# Patient Record
Sex: FEMALE | Race: BLACK OR AFRICAN AMERICAN | Employment: OTHER | URBAN - METROPOLITAN AREA
[De-identification: names, ages, dates, MRNs, and addresses within clinical notes are randomized per-mention and may not be internally consistent; named-entity substitution may affect disease eponyms.]

---

## 2021-12-15 ENCOUNTER — APPOINTMENT (OUTPATIENT)
Dept: CT IMAGING | Age: 76
End: 2021-12-15
Payer: MEDICARE

## 2021-12-15 ENCOUNTER — HOSPITAL ENCOUNTER (EMERGENCY)
Age: 76
Discharge: HOME | End: 2021-12-15
Attending: EMERGENCY MEDICINE
Payer: MEDICARE

## 2021-12-15 VITALS
WEIGHT: 171 LBS | HEIGHT: 64 IN | BODY MASS INDEX: 29.19 KG/M2 | DIASTOLIC BLOOD PRESSURE: 75 MMHG | OXYGEN SATURATION: 97 % | RESPIRATION RATE: 18 BRPM | TEMPERATURE: 97.8 F | HEART RATE: 71 BPM | SYSTOLIC BLOOD PRESSURE: 150 MMHG

## 2021-12-15 DIAGNOSIS — S01.81XA FACIAL LACERATION, INITIAL ENCOUNTER: Primary | ICD-10-CM

## 2021-12-15 DIAGNOSIS — S02.85XA ORBIT FRACTURE, LEFT, CLOSED, INITIAL ENCOUNTER (HCC): ICD-10-CM
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
## 2021-12-15 PROCEDURE — 90471 IMMUNIZATION ADMIN: CPT | Performed by: EMERGENCY MEDICINE

## 2021-12-15 PROCEDURE — 12011 RPR F/E/E/N/L/M 2.5 CM/<: CPT

## 2021-12-15 PROCEDURE — 70450 CT HEAD/BRAIN W/O DYE: CPT

## 2021-12-15 PROCEDURE — 99283 EMERGENCY DEPT VISIT LOW MDM: CPT

## 2021-12-15 PROCEDURE — 90715 TDAP VACCINE 7 YRS/> IM: CPT | Performed by: EMERGENCY MEDICINE

## 2021-12-15 PROCEDURE — 6360000002 HC RX W HCPCS: Performed by: EMERGENCY MEDICINE

## 2021-12-15 PROCEDURE — 70486 CT MAXILLOFACIAL W/O DYE: CPT

## 2021-12-15 RX ORDER — LOSARTAN POTASSIUM 50 MG/1
50 TABLET ORAL DAILY
COMMUNITY

## 2021-12-15 RX ADMIN — TETANUS TOXOID, REDUCED DIPHTHERIA TOXOID AND ACELLULAR PERTUSSIS VACCINE, ADSORBED 0.5 ML: 5; 2.5; 8; 8; 2.5 SUSPENSION INTRAMUSCULAR at 06:54

## 2021-12-15 ASSESSMENT — PAIN SCALES - GENERAL: PAINLEVEL_OUTOF10: 6

## 2021-12-15 NOTE — ED NOTES
Discharge instructions given, patient acknowledged understanding, patient ambulated out of ed upon discharge      Ade Hamlin RN  12/15/21 [id number]

## 2021-12-15 NOTE — ED PROVIDER NOTES
[address] [facility]  eMERGENCY dEPARTMENT eNCOUnter        Pt Name: Claudia Smith  MRN: [id number]  [place] 1945  Date of evaluation: 12/15/2021  Provider: Tyler Kendall MD  PCP: Referring Not In System (Inactive)      CHIEF COMPLAINT       Chief Complaint   Patient presents with    Fall     when asleep she rolled out of bed and hit left side of face on bedside table swelling to left eye and laceration to cheek        HISTORY OFPRESENT ILLNESS   (Location/Symptom, Timing/Onset, Context/Setting, Quality, Duration, Modifying Factors,Severity)  Note limiting factors. Claudia Smith is a 64 y.o. female states she rolled out of bed and struck her face on the floor while she was sleeping complains of pain over the left cheek with a laceration no other complaints of neck or head pain she is not on any blood thinners denies any other injuries to the chest shoulder or wrist or extremities    Nursing Notes were all reviewed and agreed with or any disagreements were addressed  in the HPI. REVIEW OF SYSTEMS    (2-9 systems for level 4, 10 or more for level 5)       REVIEW OF SYSTEMS    Constitutional:  Denies fever, chills, or weakness   Eyes:  Denies vision changes  HENT:  Denies sore throat or neck pain   Respiratory:  Denies cough or shortness of breath   Cardiovascular:  Denies chest pain  GI:  Denies abdominal pain, nausea, vomiting, or diarrhea   Musculoskeletal:  Denies back pain   Skin: no rash or vesicles   Neurologic:  no headache weakness focal    Lymphatic:  no swollen  nodes   Psychiatric: no si or hs thoughts     All systems negative except as marked. Positives and Pertinent negatives as per HPI. Except as noted above in the ROS, all other systems were reviewed andnegative.        PASTMEDICAL HISTORY     Past Medical History:   Diagnosis Date    Diabetes mellitus ([facility] [address]     Hyperlipidemia          SURGICAL HISTORY       Past Surgical History: Procedure Laterality Date    BREAST SURGERY      biposies x5    TOTAL KNEE ARTHROPLASTY      Left         CURRENT MEDICATIONS       Previous Medications    ASPIRIN 81 MG TABLET    Take 81 mg by mouth daily. LOSARTAN (COZAAR) 50 MG TABLET    Take 50 mg by mouth daily    METFORMIN (GLUCOPHAGE) 500 MG TABLET    Take 1,000 mg by mouth 2 times daily (with meals) 500 in am, 1000 at night     METOPROLOL (LOPRESSOR) 50 MG TABLET    Take 0.5 tablets by mouth 2 times daily. SIMVASTATIN (ZOCOR) 80 MG TABLET    Take 40 mg by mouth nightly     SITAGLIPTIN (JANUVIA) 50 MG TABLET    Take 50 mg by mouth daily       ALLERGIES     Patient has no known allergies. FAMILY HISTORY     History reviewed. No pertinent family history. SOCIAL HISTORY       Social History     Socioeconomic History    Marital status:      Spouse name: None    Number of children: None    Years of education: None    Highest education level: None   Occupational History    None   Tobacco Use    Smoking status: Never Smoker    Smokeless tobacco: Never Used    Tobacco comment: second hand smoke -  smokes in the home   Substance and Sexual Activity    Alcohol use: No     Comment: minimal    Drug use: No    Sexual activity: Yes     Partners: Male   Other Topics Concern    None   Social History Narrative    None     Social Determinants of Health     Financial Resource Strain:     Difficulty of Paying Living Expenses: Not on file   Food Insecurity:     Worried About Running Out of Food in the Last Year: Not on file    Joshua of Food in the Last Year: Not on file   Transportation Needs:     Lack of Transportation (Medical): Not on file    Lack of Transportation (Non-Medical):  Not on file   Physical Activity:     Days of Exercise per Week: Not on file    Minutes of Exercise per Session: Not on file   Stress:     Feeling of Stress : Not on file   Social Connections:     Frequency of Communication with Friends and Family: Not on file    Frequency of Social Gatherings with Friends and Family: Not on file    Attends Protestant Services: Not on file    Active Member of Clubs or Organizations: Not on file    Attends Club or Organization Meetings: Not on file    Marital Status: Not on file   Intimate Partner Violence:     Fear of Current or Ex-Partner: Not on file    Emotionally Abused: Not on file    Physically Abused: Not on file    Sexually Abused: Not on file   Housing Stability:     Unable to Pay for Housing in the Last Year: Not on file    Number of [place] in the Last Year: Not on file    Unstable Housing in the Last Year: Not on file       SCREENINGS             PHYSICAL EXAM    (up to 7 for level 4, 8 or more for level 5)     ED Triage Vitals [12/15/21 1306]   BP Temp Temp Source Pulse Resp SpO2 Height Weight   (!) 150/75 97.8 °F (36.6 °C) Oral 71 18 97 % 5' 4\" (1.626 m) 171 lb (77.6 kg)           General Appearance:  Alert, cooperative, no distress, appears stated age. Head:  Normocephalic, without obvious abnormality, atraumatic. Eyes:  conjunctiva/corneas clear, EOM's intact. Sclera anicteric. ENT: Mucous membranes moist. swelling of the left cheek with a 1 cm laceration   Neck: Supple, symmetrical, trachea midline, no adenopathy. No jugular venous distention. Lungs:   No Respiratory Distress. no rales  rhonchi rub   Chest Wall:  Nontender  no deformity   Heart:  Rsr no murmer gallop    Abdomen:   Soft nontender no organomegally    Extremities:  Full range of motion. no deformity   Pulses: Equal  upper and lower    Skin:  No rashes or lesions to exposed skin. Neurologic: Alert and oriented X 3. Motor grossly normal.  Speech clear. Cr n 2-12 intact       DIAGNOSTIC RESULTS   LABS:    Labs Reviewed - No data to display    All other labs were within normal range or not returned as of thisdictation. EKG:  All EKG's are interpreted by the Emergency Department Physician who either signs or follow-up      The patient tolerated their visit well. Thepatient and / or the family were informed of the results of any tests, a time was given to answer questions. FINAL IMPRESSION      1. Facial laceration, initial encounter    2. Fall, initial encounter    3. Orbit fracture, left, closed, initial encounter [facility])        DISPOSITION/PLAN   DISPOSITION Decision To Discharge 12/15/2021 06:23:12 AM      PATIENT REFERRED TO:  Mike Roberts MD  [address] [place] [address]  [phone]            DISCHARGE MEDICATIONS:  New Prescriptions    No medications on file       DISCONTINUED MEDICATIONS:  Discontinued Medications    No medications on file              (Please note that portions of this note were completed with a voice recognition program.  Efforts were made to edit the dictations but occasionally words aremis-transcribed.)    Elana Hurst MD (electronically signed)          Elana Hurst MD  12/15/21 5857

## 2021-12-22 ENCOUNTER — OFFICE VISIT (OUTPATIENT)
Dept: ENT CLINIC | Age: 76
End: 2021-12-22
Payer: MEDICARE

## 2021-12-22 VITALS — HEIGHT: 64 IN | WEIGHT: 177 LBS | BODY MASS INDEX: 30.22 KG/M2
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
## 2021-12-22 DIAGNOSIS — S01.81XA FACIAL LACERATION, INITIAL ENCOUNTER: ICD-10-CM

## 2021-12-22 DIAGNOSIS — S02.32XA CLOSED FRACTURE OF LEFT ORBITAL FLOOR, INITIAL ENCOUNTER (HCC): Primary | ICD-10-CM

## 2021-12-22 PROCEDURE — 99204 OFFICE O/P NEW MOD 45 MIN: CPT | Performed by: STUDENT IN AN ORGANIZED HEALTH CARE EDUCATION/TRAINING PROGRAM

## 2021-12-22 NOTE — PROGRESS NOTES
[address] [facility] (:  1945) is a 64 y.o. female, here for evaluation of the following chief complaint(s):  No chief complaint on file. ASSESSMENT/PLAN:  1. Closed fracture of left orbital floor, initial encounter ([address]  2. Facial laceration, initial encounter  3. Fall, initial encounter      This is a very pleasant 64 y.o. female here today for evaluation of the the above-noted complaints. Independently interpreted the patient's CT maxillofacial scan and it shows evidence of a minimally displaced left orbital floor fracture with associated hemorrhage in the sinus. She has no other apparent facial fractures. The patient has a 1 cm linear laceration of the left cheek area. This is currently covered with Dermabond. I instructed the patient to remove the Dermabond and to begin routine post incision treatment    The patient I had a lengthy discussion regarding management of her facial fracture. At is it is minimally displaced, there is no evidence of entrapment, she has no evidence of enophthalmos, we discussed that observation is appropriate at this time. I have asked the patient to adhere to sinus precautions going forward. She should adhere to these for the next 2 weeks. The risks of nosebleeds in the setting of perifacial trauma were discussed. Medical Decision Making: The following items were considered in medical decision making:  Independent review of images  Review / order clinical lab tests  Review / order radiology tests  Decision to obtain old records  Review and summation of old records as accessed through [facility] if applicable    SUBJECTIVE/OBJECTIVE:  NANDINI Ruiz is here today for evaluation of issues related to a fall. The patient states she was in bed and fell when she was having a dream she was fighting a demon.   She states she fell and struck the left side of her

## 2022-02-15 ENCOUNTER — TELEPHONE (OUTPATIENT)
Dept: ENT CLINIC | Age: 77
End: 2022-02-15

## 2022-02-15 NOTE — TELEPHONE ENCOUNTER
Patient called and to let Dr Alcira Kimble know that the injury to her eye (12/15) is leaking and swollen still. It is still dark in color. It feels different on the inside and is tearing. Patient is concerned that something isn't right.

## 2022-02-16 ENCOUNTER — OFFICE VISIT (OUTPATIENT)
Dept: ENT CLINIC | Age: 77
End: 2022-02-16
Payer: MEDICARE

## 2022-02-16 VITALS
HEART RATE: 83 BPM | SYSTOLIC BLOOD PRESSURE: 145 MMHG | BODY MASS INDEX: 29.88 KG/M2 | DIASTOLIC BLOOD PRESSURE: 74 MMHG | HEIGHT: 63 IN | WEIGHT: 168.6 LBS

## 2022-02-16 DIAGNOSIS — S02.32XD CLOSED FRACTURE OF LEFT ORBITAL FLOOR WITH ROUTINE HEALING, SUBSEQUENT ENCOUNTER: Primary | ICD-10-CM
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
## 2022-02-16 DIAGNOSIS — S01.81XD FACIAL LACERATION, SUBSEQUENT ENCOUNTER: ICD-10-CM

## 2022-02-16 DIAGNOSIS — H02.402 PTOSIS OF LEFT EYELID: ICD-10-CM

## 2022-02-16 PROCEDURE — 99213 OFFICE O/P EST LOW 20 MIN: CPT | Performed by: STUDENT IN AN ORGANIZED HEALTH CARE EDUCATION/TRAINING PROGRAM

## 2022-02-16 NOTE — PROGRESS NOTES
[address] [facility] (:  1945) is a 64 y.o. female, here for evaluation of the following chief complaint(s):  Post-Op Check (Post Op Check)      ASSESSMENT/PLAN:  1. Closed fracture of left orbital floor with routine healing, subsequent encounter  2. Facial laceration, subsequent encounter  3. Fall, subsequent encounter  4. Ptosis of left eyelid      This is a very pleasant 64 y.o. female here today for evaluation of the the above-noted complaints. Independently interpreted the patient's CT maxillofacial scan and it shows evidence of a minimally displaced left orbital floor fracture with associated hemorrhage in the sinus. She has no other apparent facial fractures. The patient has a 1 cm linear laceration of the left cheek area. This area has now healed but the patient feels like she is getting some crusting at the site of the repair as well as some tenderness and swelling underneath. On exam, it appears that there is normal scar tissue that is beginning to resolve. We discussed that there can be some neuropathic pain related to trauma and interruption of typical pain fibers at the site of injury. I would like her to start mupirocin ointment to the area for the next 7 days. I have asked her to follow-up with her ophthalmologist regarding her eye complaints. I see no clear etiology to explain her symptoms and I do not feel it is plated to her limited orbital floor fracture. Medical Decision Making: The following items were considered in medical decision making:  Independent review of images  Review / order clinical lab tests  Review / order radiology tests  Decision to obtain old records  Review and summation of old records as accessed through [facility] if applicable    SUBJECTIVE/OBJECTIVE:  NANDINI Jackson Stefan is here today for evaluation of issues related to a fall.   The patient states she was in bed and fell when she was having a dream she was fighting a demon. She states she fell and struck the left side of her face. She went to the emergency department and had a CT scan which showed an orbital floor fracture. The patient denies any visual issues at this point. She denies any numbness. She has not had any significant bleeding from her nose. She does not notice any cosmetic changes except for where she had a laceration on of her cheek. Update February 16, 2022:    Patient presents today for follow-up regarding issues related to the site of her injury. The patient states that she has persistent pain at the site of her right infraorbital laceration repair, feels like the area is swollen and will occasionally get crusting or discharge from the area. She also will occasionally feel like there is a film in her right and like her left upper eyelid occasionally will droop. REVIEW OF SYSTEMS  The following systems were reviewed and revealed the following in addition to any already discussed in the HPI:    PHYSICAL EXAM    GENERAL: No acute distress, alert and oriented, no hoarseness, strong voice  EYES: EOMI, Anti-icteric  HENT:   Exam, there is evidence of a well-healed 1/2 cm laceration just below the infraorbital nerve on the left. There is some firm tissue underneath that I suspect is related to scar tissue. There was a small area of crusting at the site of the repair. No apparent eye droop on my exam.  No evidence of conjunctivitis. This note was generated completely or in part utilizing Dragon dictation speech recognition software. Occasionally, words are mistranscribed and despite editing, the text may contain inaccuracies due to incorrect word recognition. If further clarification is needed please contact the office at [phone]. An electronic signature was used to authenticate this note.     --Juli Akins MD

## 2023-09-29 ENCOUNTER — HOSPITAL ENCOUNTER (EMERGENCY)
Age: 78
Discharge: HOME | End: 2023-09-29
Payer: MEDICARE

## 2023-09-29 ENCOUNTER — APPOINTMENT (OUTPATIENT)
Dept: GENERAL RADIOLOGY | Age: 78
End: 2023-09-29
Payer: MEDICARE

## 2023-09-29 VITALS
WEIGHT: 171 LBS | SYSTOLIC BLOOD PRESSURE: 131 MMHG | HEIGHT: 64 IN | TEMPERATURE: 97.9 F | RESPIRATION RATE: 19 BRPM | DIASTOLIC BLOOD PRESSURE: 64 MMHG | HEART RATE: 63 BPM | OXYGEN SATURATION: 100 % | BODY MASS INDEX: 29.19 KG/M2

## 2023-09-29 DIAGNOSIS — M54.50 ACUTE EXACERBATION OF CHRONIC LOW BACK PAIN: Primary | ICD-10-CM

## 2023-09-29 DIAGNOSIS — G89.29 ACUTE EXACERBATION OF CHRONIC LOW BACK PAIN: Primary | ICD-10-CM

## 2023-09-29 PROCEDURE — 72100 X-RAY EXAM L-S SPINE 2/3 VWS: CPT

## 2023-09-29 PROCEDURE — 99283 EMERGENCY DEPT VISIT LOW MDM: CPT

## 2023-09-29 PROCEDURE — 6370000000 HC RX 637 (ALT 250 FOR IP): Performed by: PHYSICIAN ASSISTANT

## 2023-09-29 RX ORDER — CYCLOBENZAPRINE HCL 10 MG
10 TABLET ORAL 3 TIMES DAILY PRN
Qty: 21 TABLET | Refills: 0 | Status: SHIPPED | OUTPATIENT
Start: 2023-09-29 | End: 2023-10-09

## 2023-09-29 RX ORDER — LIDOCAINE 50 MG/G
1 PATCH TOPICAL DAILY
Qty: 30 PATCH | Refills: 0 | Status: SHIPPED | OUTPATIENT
Start: 2023-09-29

## 2023-09-29 RX ORDER — IBUPROFEN 600 MG/1
600 TABLET ORAL ONCE
Status: COMPLETED | OUTPATIENT
Start: 2023-09-29 | End: 2023-09-29

## 2023-09-29 RX ORDER — IBUPROFEN 600 MG/1
600 TABLET ORAL EVERY 6 HOURS PRN
Qty: 20 TABLET | Refills: 0 | Status: SHIPPED | OUTPATIENT
Start: 2023-09-29

## 2023-09-29 RX ORDER — LIDOCAINE 4 G/G
1 PATCH TOPICAL ONCE
Status: DISCONTINUED | OUTPATIENT
Start: 2023-09-29 | End: 2023-09-29 | Stop reason: HOSPADM

## 2023-09-29 RX ORDER — CYCLOBENZAPRINE HCL 10 MG
10 TABLET ORAL ONCE
Status: COMPLETED | OUTPATIENT
Start: 2023-09-29 | End: 2023-09-29

## 2023-09-29 RX ORDER — VALSARTAN 160 MG/1
160 TABLET ORAL DAILY
COMMUNITY

## 2023-09-29 RX ADMIN — IBUPROFEN 600 MG: 600 TABLET, FILM COATED ORAL at 13:03

## 2023-09-29 RX ADMIN — CYCLOBENZAPRINE 10 MG: 10 TABLET, FILM COATED ORAL at 13:03

## 2023-09-29 ASSESSMENT — ENCOUNTER SYMPTOMS
ABDOMINAL PAIN: 0
NAUSEA: 0
DIARRHEA: 0
VOMITING: 0
WHEEZING: 0
COUGH: 0
RHINORRHEA: 0
SHORTNESS OF BREATH: 0
BACK PAIN: 1

## 2023-09-29 ASSESSMENT — PAIN SCALES - GENERAL: PAINLEVEL_OUTOF10: 0

## 2023-09-29 NOTE — ED PROVIDER NOTES
[facility], [place]        Pt Name: Clemente Howe  MRN: [id number]  [address] [facility] [place] 1945  Date of evaluation: 9/29/2023  Provider: Andrey Ulrich PA-C  PCP: Edwar Aparicio  Note Started: 1:06 PM EDT 9/29/23      KIM. I have evaluated this patient. CHIEF COMPLAINT       Chief Complaint   Patient presents with    Back Pain     Pt via self from home, c/o lower left back pain, states she is normally sore in the mornings, stretching usually helps, has gotten progressively worse in last two days, worse with movemement       HISTORY OF PRESENT ILLNESS: 1 or more Elements     History From: patient   Limitations to history : None    Clemente Howe is a 76 y.o. female who presents for evaluation of left low back pain. Patient states that she frequently gets back pain but usually she gets up and starts moving around stretching out and gets better. States that yesterday morning it did not get better and then got progressively worse throughout the day, radiating into her left buttock. No numbness tingling or weakness distally. No saddle anesthesia, bladder retention or bowel incontinence. She denies falls injury or trauma. No  heavy lifting bending or twisting. She did have some improvement with Aleve yesterday. She has no other complaints or concerns at this time. Nursing Notes were all reviewed and agreed with or any disagreements were addressed in the HPI. REVIEW OF SYSTEMS :      Review of Systems   Constitutional:  Negative for appetite change, chills and fever. HENT:  Negative for congestion and rhinorrhea. Respiratory:  Negative for cough, shortness of breath and wheezing. Cardiovascular:  Negative for chest pain. Gastrointestinal:  Negative for abdominal pain, diarrhea, nausea and vomiting. Genitourinary:  Negative for difficulty urinating, dysuria and hematuria. Musculoskeletal:  Positive for back pain.

## 2024-07-20 ENCOUNTER — HOSPITAL ENCOUNTER (EMERGENCY)
Age: 79
Discharge: HOME | End: 2024-07-20
Payer: MEDICARE

## 2024-07-20 ENCOUNTER — APPOINTMENT (OUTPATIENT)
Dept: GENERAL RADIOLOGY | Age: 79
End: 2024-07-20
Payer: MEDICARE

## 2024-07-20 VITALS
OXYGEN SATURATION: 97 % | HEART RATE: 70 BPM | RESPIRATION RATE: 16 BRPM | DIASTOLIC BLOOD PRESSURE: 83 MMHG | WEIGHT: 167.3 LBS | SYSTOLIC BLOOD PRESSURE: 183 MMHG | BODY MASS INDEX: 28.72 KG/M2 | TEMPERATURE: 98.7 F

## 2024-07-20 DIAGNOSIS — M54.13 RADICULOPATHY, CERVICOTHORACIC REGION: Primary | ICD-10-CM

## 2024-07-20 DIAGNOSIS — M51.34 THORACIC DEGENERATIVE DISC DISEASE: ICD-10-CM

## 2024-07-20 DIAGNOSIS — M50.30 DEGENERATIVE DISC DISEASE, CERVICAL: ICD-10-CM

## 2024-07-20 PROCEDURE — 72040 X-RAY EXAM NECK SPINE 2-3 VW: CPT

## 2024-07-20 PROCEDURE — 6370000000 HC RX 637 (ALT 250 FOR IP): Performed by: GENERAL ACUTE CARE HOSPITAL

## 2024-07-20 PROCEDURE — 99283 EMERGENCY DEPT VISIT LOW MDM: CPT

## 2024-07-20 PROCEDURE — 72072 X-RAY EXAM THORAC SPINE 3VWS: CPT

## 2024-07-20 RX ORDER — IBUPROFEN 600 MG/1
600 TABLET ORAL ONCE
Status: COMPLETED | OUTPATIENT
Start: 2024-07-20 | End: 2024-07-20

## 2024-07-20 RX ORDER — LIDOCAINE 50 MG/G
1 PATCH TOPICAL DAILY
Qty: 30 PATCH | Refills: 0 | Status: SHIPPED | OUTPATIENT
Start: 2024-07-20

## 2024-07-20 RX ORDER — LIDOCAINE 4 G/G
1 PATCH TOPICAL DAILY
Status: DISCONTINUED | OUTPATIENT
Start: 2024-07-20 | End: 2024-07-20 | Stop reason: HOSPADM

## 2024-07-20 RX ADMIN — IBUPROFEN 600 MG: 600 TABLET, FILM COATED ORAL at 08:20

## 2024-07-20 ASSESSMENT — ENCOUNTER SYMPTOMS
CHEST TIGHTNESS: 0
COUGH: 0
ABDOMINAL PAIN: 0
SHORTNESS OF BREATH: 0
VOMITING: 0
NAUSEA: 0
WHEEZING: 0
BACK PAIN: 1
VOICE CHANGE: 0
SORE THROAT: 0

## 2024-07-20 ASSESSMENT — PAIN SCALES - GENERAL: PAINLEVEL_OUTOF10: 5

## 2024-07-20 ASSESSMENT — PAIN DESCRIPTION - LOCATION: LOCATION: NECK

## 2024-07-20 ASSESSMENT — PAIN - FUNCTIONAL ASSESSMENT: PAIN_FUNCTIONAL_ASSESSMENT: 0-10

## 2024-07-20 NOTE — ED PROVIDER NOTES
cervical thoracic radiculopathy      Patient medicated with ibuprofen 600 mg by mouth.  Topical lidocaine patch applied to the affected area.  C-spine x-ray interpreted by radiologist and reviewed by myself shows degenerative disc but no acute fracture or malalignment, T-spine x-ray interpreted by radiologist and reviewed by myself shows degenerative changes but no acute fracture or malalignment.    Patient reassessed and updated on test results.  Patient does report improvement of symptoms after intervention.  At this time there is no evidence of any life-threatening or emergent conditions requiring immediate intervention.  Shared decision making employed and patient is agreeable to discharge with emphasis on close outpatient follow-up.  Patient is encouraged to apply ice to the affected area for 20 minutes every 3-4 hours.  She is advised to begin gentle stretching exercises tomorrow.  She is advised that she may take OTC Tylenol or ibuprofen as directed to help with her discomfort.  She agrees to follow-up with her primary care provider within the next 2 to 3 days.  She states that she would like to proceed with physical therapy.   She agrees return for high fever, incessant vomiting, severe pain, any other worsening symptoms.    Appropriate for outpatient management       The patient is at low risk for mortality based on demographic, history and clinical factors. Given the best available information and clinical assessment, I estimate the risk of hospitalization to be greater than risk of treatment at home. I have explained to the patient that the risk could rapidly change, given precautions for return and instructions. Explained to patient that the risk for mortality is low based on demographic, history and clinical factors.      I discussed with patient the results of evaluation in the ED, diagnosis, care, and prognosis.  The plan is to discharge to home.  Patient is in agreement with plan and questions have  APRN - CNP (electronically signed)        Rima Hernandez, JAMIE - CNP  07/20/24 1019

## 2024-07-20 NOTE — DISCHARGE INSTRUCTIONS
Apply ice to the affected area for 20 minutes every 3-4 hours.  Begin gentle stretching exercises tomorrow.  Use topical lidocaine patches as directed.  Take OTC Tylenol or ibuprofen as directed for discomfort.  Follow-up with your primary care provider on Monday to schedule your physical therapy.  Return for new or worsening symptoms.